# Patient Record
(demographics unavailable — no encounter records)

---

## 2024-11-13 NOTE — REVIEW OF SYSTEMS
Called patient no answer.  Need to reschedule no show  
Called patient no answer.  Need to reschedule no show   
Called pt ph# busy.  Need to reschedule no show  
[Negative] : Heme/Lymph

## 2024-11-18 NOTE — PHYSICAL EXAM
[General Appearance - Well Developed] : well developed [Normal Appearance] : normal appearance [Well Groomed] : well groomed [General Appearance - Well Nourished] : well nourished [No Deformities] : no deformities [General Appearance - In No Acute Distress] : no acute distress [Normal Conjunctiva] : the conjunctiva exhibited no abnormalities [Normal Oral Mucosa] : normal oral mucosa [No Oral Pallor] : no oral pallor [No Oral Cyanosis] : no oral cyanosis [Normal Jugular Venous A Waves Present] : normal jugular venous A waves present [Normal Jugular Venous V Waves Present] : normal jugular venous V waves present [No Jugular Venous Iwtt A Waves] : no jugular venous witt A waves [Respiration, Rhythm And Depth] : normal respiratory rhythm and effort [Exaggerated Use Of Accessory Muscles For Inspiration] : no accessory muscle use [Auscultation Breath Sounds / Voice Sounds] : lungs were clear to auscultation bilaterally [Bowel Sounds] : normal bowel sounds [Abdomen Soft] : soft [Abdomen Tenderness] : non-tender [Abnormal Walk] : normal gait [Gait - Sufficient For Exercise Testing] : the gait was sufficient for exercise testing [Nail Clubbing] : no clubbing of the fingernails [Cyanosis, Localized] : no localized cyanosis [Petechial Hemorrhages (___cm)] : no petechial hemorrhages [Skin Color & Pigmentation] : normal skin color and pigmentation [] : no rash [No Skin Ulcers] : no skin ulcer [Oriented To Time, Place, And Person] : oriented to person, place, and time [Mood] : the mood was normal [No Anxiety] : not feeling anxious [Normal Rate] : normal [Rhythm Regular] : regular [Normal S1] : normal S1 [Normal S2] : normal S2 [No Murmur] : no murmurs heard [No Pitting Edema] : no pitting edema present [FreeTextEntry1] : His abdominal examination was limited by his body habitus. [S3] : no S3 [Right Carotid Bruit] : no bruit heard over the right carotid [Left Carotid Bruit] : no bruit heard over the left carotid [Bruit] : no bruit heard

## 2024-11-18 NOTE — PHYSICAL EXAM
[General Appearance - Well Developed] : well developed [Normal Appearance] : normal appearance [Well Groomed] : well groomed [General Appearance - Well Nourished] : well nourished [No Deformities] : no deformities [General Appearance - In No Acute Distress] : no acute distress [Normal Conjunctiva] : the conjunctiva exhibited no abnormalities [Normal Oral Mucosa] : normal oral mucosa [No Oral Pallor] : no oral pallor [No Oral Cyanosis] : no oral cyanosis [Normal Jugular Venous A Waves Present] : normal jugular venous A waves present [Normal Jugular Venous V Waves Present] : normal jugular venous V waves present [No Jugular Venous Witt A Waves] : no jugular venous witt A waves [Respiration, Rhythm And Depth] : normal respiratory rhythm and effort [Exaggerated Use Of Accessory Muscles For Inspiration] : no accessory muscle use [Auscultation Breath Sounds / Voice Sounds] : lungs were clear to auscultation bilaterally [Bowel Sounds] : normal bowel sounds [Abdomen Soft] : soft [Abdomen Tenderness] : non-tender [Abnormal Walk] : normal gait [Gait - Sufficient For Exercise Testing] : the gait was sufficient for exercise testing [Nail Clubbing] : no clubbing of the fingernails [Cyanosis, Localized] : no localized cyanosis [Petechial Hemorrhages (___cm)] : no petechial hemorrhages [Skin Color & Pigmentation] : normal skin color and pigmentation [] : no rash [No Skin Ulcers] : no skin ulcer [Oriented To Time, Place, And Person] : oriented to person, place, and time [Mood] : the mood was normal [No Anxiety] : not feeling anxious [Normal Rate] : normal [Rhythm Regular] : regular [Normal S1] : normal S1 [Normal S2] : normal S2 [No Murmur] : no murmurs heard [No Pitting Edema] : no pitting edema present [FreeTextEntry1] : His abdominal examination was limited by his body habitus. [S3] : no S3 [Right Carotid Bruit] : no bruit heard over the right carotid [Left Carotid Bruit] : no bruit heard over the left carotid [Bruit] : no bruit heard

## 2024-11-18 NOTE — CARDIOLOGY SUMMARY
[LVEF ___%] : LVEF [unfilled]% [___] : [unfilled] [de-identified] : 11/13/2024: Sinus Rhythm at 87 bpm with early transition. [de-identified] : 7 day ZIO patch performed on 8/21/2024: Sinus Rhythm with rare overall ectopy. Average heart rate of 90 beats per minute.   3 day ZIO patch performed on 9/11/2021: Sinus Rhythm with rare overall ectopy. Elevated average heart rate (105 beats per minute).  [de-identified] : Exercise Nuclear Stress Test performed on 10/30/2024: Normal myocardial perfusion scan, with no evidence of infarction or inducible ischemia. Stress electrocardiogram: No significant ischemic ST segment changes beyond baseline abnormalities. Patient achieved 8 METS, which is consistent with fair exercise capacity. Normal heart rate response. Normal blood pressure response. There is a small-sized, mild defect of the basal to mid anterior wall that is fixed with normal wall motion and corrects with prone imaging, suggestive of chest wall attenuation artifact. There is a small-sized, mild defect of the basal to mid inferior wall that is fixed with preserved systolic thickening and corrects with prone imaging, suggestive of diaphragmatic attenuation artifact. The left ventricle is normal in function. The post stress left ventricular EF is 80 %. The stress end diastolic volume is 55 ml and systolic volume is 15 ml. Arrhythmias: Single VPD occurred during stress, was unaffected by stress.   Exercise Nuclear Stress Test 12/8/2021: Poor exercise capacity ( 8 METS).  No ischemic ECG changes. Medium sized, mild defect in the basal to mid anterior wall that is reversible and predominately corrects with prone imaging, consistent wit a small area of ischemia of the basal anterior wall. Small, mild defect in basal inferior wall that is reversible and does not correct with prone imaging, suggestive of mild ischemia. LVEF 52%, revealing overall low normal left ventricular ejection fraction with hypokinesis of the basal anterior and basal inferior walls. [de-identified] : 1/18/2024: Endocardium not well visualized; grossly preserved left ventricular ejection fraction. Poor endocardial definition precludes adequate assessment for segmental wall motion abnormalities. EF is approximately 65%. Concentric left ventricular remodeling.  Reversal of the E/A waves of the mitral inflow pattern consistent with reduced compliance of the left ventricle. Normal right ventricular cavity size and normal systolic function. Mitral annular calcification with thickened and calcified mitral valve leaflets and decreased opening. Mild mitral regurgitation.  Mild mitral valve stenosis. Mild tricuspid regurgitation. PASP= 26 mmHg. No echocardiographic evidence of pulmonary hypertension. Trileaflet aortic valve with normal systolic excursion. There is calcification of the aortic valve leaflets. Trace aortic regurgitation. Trace pericardial effusion.

## 2024-11-18 NOTE — HISTORY OF PRESENT ILLNESS
[FreeTextEntry1] : Patient is a 48-year-old man with a history of dyslipidemia, type 2 diabetes mellitus, obesity status post gastric bypass surgery in the past, nonobstructive CAD, tachycardia, and depression who presents today for follow up of dyslipidemia and tachycardia. He states that he had a syncopal episode on 8/16/2024 when he got up from the car and started to feel dizzy and passed out for a few seconds. He states that he has felt better since his last visit with me with no further syncopal episodes and he states that his episodes of dizziness are improved. He otherwise denies any exertional chest pain, dyspnea at rest, palpitations, and headaches. He states that he is currently taking Metoprolol 25 mg twice daily.

## 2024-11-18 NOTE — CARDIOLOGY SUMMARY
[LVEF ___%] : LVEF [unfilled]% [___] : [unfilled] [de-identified] : 11/13/2024: Sinus Rhythm at 87 bpm with early transition. [de-identified] : 7 day ZIO patch performed on 8/21/2024: Sinus Rhythm with rare overall ectopy. Average heart rate of 90 beats per minute.   3 day ZIO patch performed on 9/11/2021: Sinus Rhythm with rare overall ectopy. Elevated average heart rate (105 beats per minute).  [de-identified] : Exercise Nuclear Stress Test performed on 10/30/2024: Normal myocardial perfusion scan, with no evidence of infarction or inducible ischemia. Stress electrocardiogram: No significant ischemic ST segment changes beyond baseline abnormalities. Patient achieved 8 METS, which is consistent with fair exercise capacity. Normal heart rate response. Normal blood pressure response. There is a small-sized, mild defect of the basal to mid anterior wall that is fixed with normal wall motion and corrects with prone imaging, suggestive of chest wall attenuation artifact. There is a small-sized, mild defect of the basal to mid inferior wall that is fixed with preserved systolic thickening and corrects with prone imaging, suggestive of diaphragmatic attenuation artifact. The left ventricle is normal in function. The post stress left ventricular EF is 80 %. The stress end diastolic volume is 55 ml and systolic volume is 15 ml. Arrhythmias: Single VPD occurred during stress, was unaffected by stress.   Exercise Nuclear Stress Test 12/8/2021: Poor exercise capacity ( 8 METS).  No ischemic ECG changes. Medium sized, mild defect in the basal to mid anterior wall that is reversible and predominately corrects with prone imaging, consistent wit a small area of ischemia of the basal anterior wall. Small, mild defect in basal inferior wall that is reversible and does not correct with prone imaging, suggestive of mild ischemia. LVEF 52%, revealing overall low normal left ventricular ejection fraction with hypokinesis of the basal anterior and basal inferior walls. [de-identified] : 1/18/2024: Endocardium not well visualized; grossly preserved left ventricular ejection fraction. Poor endocardial definition precludes adequate assessment for segmental wall motion abnormalities. EF is approximately 65%. Concentric left ventricular remodeling.  Reversal of the E/A waves of the mitral inflow pattern consistent with reduced compliance of the left ventricle. Normal right ventricular cavity size and normal systolic function. Mitral annular calcification with thickened and calcified mitral valve leaflets and decreased opening. Mild mitral regurgitation.  Mild mitral valve stenosis. Mild tricuspid regurgitation. PASP= 26 mmHg. No echocardiographic evidence of pulmonary hypertension. Trileaflet aortic valve with normal systolic excursion. There is calcification of the aortic valve leaflets. Trace aortic regurgitation. Trace pericardial effusion.

## 2024-11-18 NOTE — DISCUSSION/SUMMARY
[FreeTextEntry1] : IMPRESSION: Mr. Abraham is a 48-year-old man with a history of dyslipidemia, type 2 diabetes mellitus, obesity status post gastric bypass surgery in the past, nonobstructive CAD, tachycardia, and depression who presents today for follow up of tachycardia and dyslipidemia.  PLAN: 1. He had a Cardiac CT in 2/2016 that revealed mild CAD of the mid LAD. He will continue on ASA 81mg daily. He understands the importance of having a good cholesterol and improving his Diabetes. He had a nuclear stress test last month that revealed normal LV function and no ischemia.  2. His most recent LDL was OK. He will continue on Fenofibrate 145 mg daily and Lipitor 40 mg daily. His ideal LDL is less than 70, which it is slightly above that at this point. He will modify his diet for now. He was in sinus rhythm on his ECG that was performed today. His most recent LFTs were normal.  3. His heart rate is fine both on exam and on his ECG that was performed today. He will continue on Metoprolol 25 mg twice daily. We have also discussed the importance of reducing his caffeine intake and increasing his water intake.  4. He will follow up with me in 4 months or sooner should he experience any symptoms in the interim. [EKG obtained to assist in diagnosis and management of assessed problem(s)] : EKG obtained to assist in diagnosis and management of assessed problem(s)

## 2024-11-18 NOTE — CARDIOLOGY SUMMARY
[LVEF ___%] : LVEF [unfilled]% [___] : [unfilled] [de-identified] : 11/13/2024: Sinus Rhythm at 87 bpm with early transition. [de-identified] : 7 day ZIO patch performed on 8/21/2024: Sinus Rhythm with rare overall ectopy. Average heart rate of 90 beats per minute.   3 day ZIO patch performed on 9/11/2021: Sinus Rhythm with rare overall ectopy. Elevated average heart rate (105 beats per minute).  [de-identified] : Exercise Nuclear Stress Test performed on 10/30/2024: Normal myocardial perfusion scan, with no evidence of infarction or inducible ischemia. Stress electrocardiogram: No significant ischemic ST segment changes beyond baseline abnormalities. Patient achieved 8 METS, which is consistent with fair exercise capacity. Normal heart rate response. Normal blood pressure response. There is a small-sized, mild defect of the basal to mid anterior wall that is fixed with normal wall motion and corrects with prone imaging, suggestive of chest wall attenuation artifact. There is a small-sized, mild defect of the basal to mid inferior wall that is fixed with preserved systolic thickening and corrects with prone imaging, suggestive of diaphragmatic attenuation artifact. The left ventricle is normal in function. The post stress left ventricular EF is 80 %. The stress end diastolic volume is 55 ml and systolic volume is 15 ml. Arrhythmias: Single VPD occurred during stress, was unaffected by stress.   Exercise Nuclear Stress Test 12/8/2021: Poor exercise capacity ( 8 METS).  No ischemic ECG changes. Medium sized, mild defect in the basal to mid anterior wall that is reversible and predominately corrects with prone imaging, consistent wit a small area of ischemia of the basal anterior wall. Small, mild defect in basal inferior wall that is reversible and does not correct with prone imaging, suggestive of mild ischemia. LVEF 52%, revealing overall low normal left ventricular ejection fraction with hypokinesis of the basal anterior and basal inferior walls. [de-identified] : 1/18/2024: Endocardium not well visualized; grossly preserved left ventricular ejection fraction. Poor endocardial definition precludes adequate assessment for segmental wall motion abnormalities. EF is approximately 65%. Concentric left ventricular remodeling.  Reversal of the E/A waves of the mitral inflow pattern consistent with reduced compliance of the left ventricle. Normal right ventricular cavity size and normal systolic function. Mitral annular calcification with thickened and calcified mitral valve leaflets and decreased opening. Mild mitral regurgitation.  Mild mitral valve stenosis. Mild tricuspid regurgitation. PASP= 26 mmHg. No echocardiographic evidence of pulmonary hypertension. Trileaflet aortic valve with normal systolic excursion. There is calcification of the aortic valve leaflets. Trace aortic regurgitation. Trace pericardial effusion.

## 2025-07-02 NOTE — CARDIOLOGY SUMMARY
[LVEF ___%] : LVEF [unfilled]% [___] : [unfilled] [de-identified] : 11/13/2024: Sinus Rhythm at 87 bpm with early transition. [de-identified] : 7 day ZIO patch performed on 8/21/2024: Sinus Rhythm with rare overall ectopy. Average heart rate of 90 beats per minute.   3 day ZIO patch performed on 9/11/2021: Sinus Rhythm with rare overall ectopy. Elevated average heart rate (105 beats per minute).  [de-identified] : Exercise Nuclear Stress Test performed on 10/30/2024: Normal myocardial perfusion scan, with no evidence of infarction or inducible ischemia. Stress electrocardiogram: No significant ischemic ST segment changes beyond baseline abnormalities. Patient achieved 8 METS, which is consistent with fair exercise capacity. Normal heart rate response. Normal blood pressure response. There is a small-sized, mild defect of the basal to mid anterior wall that is fixed with normal wall motion and corrects with prone imaging, suggestive of chest wall attenuation artifact. There is a small-sized, mild defect of the basal to mid inferior wall that is fixed with preserved systolic thickening and corrects with prone imaging, suggestive of diaphragmatic attenuation artifact. The left ventricle is normal in function. The post stress left ventricular EF is 80 %. The stress end diastolic volume is 55 ml and systolic volume is 15 ml. Arrhythmias: Single VPD occurred during stress, was unaffected by stress.   Exercise Nuclear Stress Test 12/8/2021: Poor exercise capacity ( 8 METS).  No ischemic ECG changes. Medium sized, mild defect in the basal to mid anterior wall that is reversible and predominately corrects with prone imaging, consistent wit a small area of ischemia of the basal anterior wall. Small, mild defect in basal inferior wall that is reversible and does not correct with prone imaging, suggestive of mild ischemia. LVEF 52%, revealing overall low normal left ventricular ejection fraction with hypokinesis of the basal anterior and basal inferior walls. [de-identified] : 1/18/2024: Endocardium not well visualized; grossly preserved left ventricular ejection fraction. Poor endocardial definition precludes adequate assessment for segmental wall motion abnormalities. EF is approximately 65%. Concentric left ventricular remodeling.  Reversal of the E/A waves of the mitral inflow pattern consistent with reduced compliance of the left ventricle. Normal right ventricular cavity size and normal systolic function. Mitral annular calcification with thickened and calcified mitral valve leaflets and decreased opening. Mild mitral regurgitation.  Mild mitral valve stenosis. Mild tricuspid regurgitation. PASP= 26 mmHg. No echocardiographic evidence of pulmonary hypertension. Trileaflet aortic valve with normal systolic excursion. There is calcification of the aortic valve leaflets. Trace aortic regurgitation. Trace pericardial effusion.

## 2025-07-02 NOTE — DISCUSSION/SUMMARY
[FreeTextEntry1] : IMPRESSION: Mr. Abraham is a 48-year-old man with a history of dyslipidemia, type 2 diabetes mellitus, obesity status post gastric bypass surgery in the past, nonobstructive CAD, tachycardia, and depression who presents today for follow up of tachycardia and dyslipidemia.  PLAN: 1. He had a Cardiac CT in 2/2016 that revealed mild CAD of the mid LAD. He will continue on ASA 81mg daily. He understands the importance of having a good cholesterol and improving his Diabetes. He had a nuclear stress test last month that revealed normal LV function and no ischemia.  2. His most recent LDL was OK. He will continue on Fenofibrate 145 mg daily and Lipitor 40 mg daily. His ideal LDL is less than 70, which it is slightly above that at this point. He will modify his diet for now. He was in sinus rhythm on his ECG that was performed today. His most recent LFTs were normal.  3. His heart rate is fine both on exam and on his ECG that was performed today. He will continue on Metoprolol 25 mg twice daily. We have also discussed the importance of reducing his caffeine intake and increasing his water intake.  4. He will follow up with me in 4 months or sooner should he experience any symptoms in the interim.

## 2025-07-02 NOTE — CARDIOLOGY SUMMARY
[LVEF ___%] : LVEF [unfilled]% [___] : [unfilled] [de-identified] : 11/13/2024: Sinus Rhythm at 87 bpm with early transition. [de-identified] : 7 day ZIO patch performed on 8/21/2024: Sinus Rhythm with rare overall ectopy. Average heart rate of 90 beats per minute.   3 day ZIO patch performed on 9/11/2021: Sinus Rhythm with rare overall ectopy. Elevated average heart rate (105 beats per minute).  [de-identified] : Exercise Nuclear Stress Test performed on 10/30/2024: Normal myocardial perfusion scan, with no evidence of infarction or inducible ischemia. Stress electrocardiogram: No significant ischemic ST segment changes beyond baseline abnormalities. Patient achieved 8 METS, which is consistent with fair exercise capacity. Normal heart rate response. Normal blood pressure response. There is a small-sized, mild defect of the basal to mid anterior wall that is fixed with normal wall motion and corrects with prone imaging, suggestive of chest wall attenuation artifact. There is a small-sized, mild defect of the basal to mid inferior wall that is fixed with preserved systolic thickening and corrects with prone imaging, suggestive of diaphragmatic attenuation artifact. The left ventricle is normal in function. The post stress left ventricular EF is 80 %. The stress end diastolic volume is 55 ml and systolic volume is 15 ml. Arrhythmias: Single VPD occurred during stress, was unaffected by stress.   Exercise Nuclear Stress Test 12/8/2021: Poor exercise capacity ( 8 METS).  No ischemic ECG changes. Medium sized, mild defect in the basal to mid anterior wall that is reversible and predominately corrects with prone imaging, consistent wit a small area of ischemia of the basal anterior wall. Small, mild defect in basal inferior wall that is reversible and does not correct with prone imaging, suggestive of mild ischemia. LVEF 52%, revealing overall low normal left ventricular ejection fraction with hypokinesis of the basal anterior and basal inferior walls. [de-identified] : 1/18/2024: Endocardium not well visualized; grossly preserved left ventricular ejection fraction. Poor endocardial definition precludes adequate assessment for segmental wall motion abnormalities. EF is approximately 65%. Concentric left ventricular remodeling.  Reversal of the E/A waves of the mitral inflow pattern consistent with reduced compliance of the left ventricle. Normal right ventricular cavity size and normal systolic function. Mitral annular calcification with thickened and calcified mitral valve leaflets and decreased opening. Mild mitral regurgitation.  Mild mitral valve stenosis. Mild tricuspid regurgitation. PASP= 26 mmHg. No echocardiographic evidence of pulmonary hypertension. Trileaflet aortic valve with normal systolic excursion. There is calcification of the aortic valve leaflets. Trace aortic regurgitation. Trace pericardial effusion.